# Patient Record
(demographics unavailable — no encounter records)

---

## 2024-12-10 NOTE — PLAN
[FreeTextEntry1] : Labs drawn in office #Obesity, Prediabetes: On Zepbound 12.5 mg weekly from  [facility], no overt side effects noted. Ricco f/u labwork. #ED: on Cialis, requests renewal, Rx generic Tadalafil 10 mg daily as needed.  #Thyroid nodules: subcm note on July US, repeat marlee 7/2025 ordered. #Gout: no recurrence since last visit, cut out red meat; on low purine diet.   Vaccines: Flu vaccine administered. discussed COVID19 booster--to get locally.  Shingrix discussed--he will consider. Colonoscopy overdue-- instructed to contact his GI physician

## 2024-12-10 NOTE — HEALTH RISK ASSESSMENT
[Very Good] : ~his/her~  mood as very good [Yes] : Yes [1 or 2 (0 pts)] : 1 or 2 (0 points) [Never (0 pts)] : Never (0 points) [No] : In the past 12 months have you used drugs other than those required for medical reasons? No [No falls in past year] : Patient reported no falls in the past year [0] : 2) Feeling down, depressed, or hopeless: Not at all (0) [PHQ-2 Negative - No further assessment needed] : PHQ-2 Negative - No further assessment needed [Former] : Former [10-14] : 10-14 [HIV test declined] : HIV test declined [Hepatitis C test declined] : Hepatitis C test declined [None] : None [Alone] : lives alone [Employed] : employed [] :  [# Of Children ___] : has [unfilled] children [Sexually Active] : sexually active [Feels Safe at Home] : Feels safe at home [Fully functional (bathing, dressing, toileting, transferring, walking, feeding)] : Fully functional (bathing, dressing, toileting, transferring, walking, feeding) [Fully functional (using the telephone, shopping, preparing meals, housekeeping, doing laundry, using] : Fully functional and needs no help or supervision to perform IADLs (using the telephone, shopping, preparing meals, housekeeping, doing laundry, using transportation, managing medications and managing finances) [Reports normal functional visual acuity (ie: able to read med bottle)] : Reports normal functional visual acuity [Smoke Detector] : smoke detector [Carbon Monoxide Detector] : carbon monoxide detector [Safety elements used in home] : safety elements used in home [Seat Belt] :  uses seat belt [Sunscreen] : uses sunscreen [Monthly or less (1 pt)] : Monthly or less (1 point) [> 15 Years] : > 15 Years [NO] : No [de-identified] : Urology, Dermatology, ENT, GI [Audit-CScore] : 1 [de-identified] : as per HPI [de-identified] : as per HP [ONK3Lptut] : 0 [de-identified] : 1 ppd x 10 yrs [Change in mental status noted] : No change in mental status noted [Language] : denies difficulty with language [Behavior] : denies difficulty with behavior [Learning/Retaining New Information] : denies difficulty learning/retaining new information [Handling Complex Tasks] : denies difficulty handling complex tasks [Reasoning] : denies difficulty with reasoning [Spatial Ability and Orientation] : denies difficulty with spatial ability and orientation [Reports changes in hearing] : Reports no changes in hearing [Reports changes in vision] : Reports no changes in vision [Reports changes in dental health] : Reports no changes in dental health [TB Exposure] : is not being exposed to tuberculosis [ColonoscopyComments] : last >10 yrs ago--to schedule appt with GI Dr. Mojica [FreeTextEntry2] :  [de-identified] : +glasses

## 2024-12-10 NOTE — PHYSICAL EXAM
[Normal Sphincter Tone] : normal sphincter tone [No Mass] : no mass [Penis Abnormality] : normal circumcised penis [Testes Tenderness] : no tenderness of the testes [Prostate Enlargement] : the prostate was not enlarged [Normal] : affect was normal and insight and judgment were intact

## 2024-12-10 NOTE — HISTORY OF PRESENT ILLNESS
[FreeTextEntry1] : annual physical [de-identified] : MARTÍNEZ HUITRON is a 63 year old male who presents for annual comprehensive exam. PMHX: Obesity, Prediabetes, ED, Gout, Thyroid Nodules. -No recurrence of gout since last visit. Has completely cut out red meat.  -On Zepbound presently at 12.5 mg weekly, has lost 35 lbs in 8 months. Tolerating well with no side effects, no nausea or changes in bowel habits. Content with the weight loss and motivated to continue. Walks 7-10K steps daily, tries to do additional 1-2 workouts/week.  -ED, was following with Dr. Love (urologist), inquires on refilling Tadalafil with me as med as been working well.

## 2025-02-10 NOTE — ASSESSMENT
[FreeTextEntry1] : Patient with history of chronic IBS.  He has been on Zepbound for 9 months and has lost 40 pounds.  He is being referred for screening colonoscopy which will be scheduled.  FOBT will be sent to the lab.  He does have a history of abnormal LFTs in the past.  An abdominal sonogram will be ordered.

## 2025-02-10 NOTE — HISTORY OF PRESENT ILLNESS
[FreeTextEntry1] : Patient is a 63-year-old gentleman who is referred for a screening colonoscopy.  His last colonoscopy was over 10 years ago.  Patient has been on Zepbound for 9 months and has lost 40 pounds.  He does have a history of irritable bowel syndrome which has been stable.  He denies seeing any blood or mucus in the stool. Patient has no upper gastrointestinal symptoms.

## 2025-02-10 NOTE — PHYSICAL EXAM

## 2025-02-24 NOTE — HISTORY OF PRESENT ILLNESS
[FreeTextEntry1] : Less than 8 months status post right trigger thumb cortisone injection #1.  He returns today,

## 2025-02-24 NOTE — PHYSICAL EXAM
[de-identified] : - Constitutional: This is a male in no obvious distress.  - Psych: Patient is alert and oriented to person, place and time.  Patient has a normal mood and affect. - Cardiovascular: Normal pulses throughout the upper extremities.  No significant varicosities are noted in the upper extremities. - Neuro: Strength and sensation are intact throughout the upper extremities.  Patient has normal coordination.  ---  Examination of his right thumb demonstrates swelling and tenderness along the A1 pulley.  There is no obvious triggering, as he cannot flex to the point of triggering.  He states that it does trigger at times.  He also has some swelling and tenderness along the IP joint and diffuse swelling of the thumb.  There is no swelling or tenderness along the CMC joint.  He is neurovascularly intact distally. [de-identified] : Radiographs both hands dated 5/1/2024 demonstrated minimal degenerative changes at the CMC joints bilaterally.

## 2025-02-26 NOTE — PHYSICAL EXAM
[de-identified] : - Constitutional: This is a male in no obvious distress.  - Psych: Patient is alert and oriented to person, place and time.  Patient has a normal mood and affect. - Cardiovascular: Normal pulses throughout the upper extremities.  No significant varicosities are noted in the upper extremities. - Neuro: Strength and sensation are intact throughout the upper extremities.  Patient has normal coordination.  ---  Examination of his right thumb demonstrates swelling and tenderness along the A1 pulley.  There is no obvious triggering.  There is no swelling or tenderness along the CMC joint.  He is neurovascularly intact distally. [de-identified] : Radiographs both hands dated 5/1/2024 demonstrated minimal degenerative changes at the CMC joints bilaterally.

## 2025-02-26 NOTE — HISTORY OF PRESENT ILLNESS
[FreeTextEntry1] : Less than 8 months status post right trigger thumb cortisone injection #1.  He returns today, for right thumb triggering. He states that the last cortisone injection did provide his symptoms with relief, and his symptoms recently recurred.

## 2025-02-26 NOTE — DISCUSSION/SUMMARY
[FreeTextEntry1] : I had a discussion regarding today's visit, the diagnosis and treatment recommendations and options.  We also discussed changes since the last visit.  At this time, I recommended a repeat cortisone injection, which he agreed to proceed with at the right trigger thumb.   The patient has agreed to the above plan of management and has expressed full understanding.  All questions were fully answered to the patient's satisfaction.  My cumulative time spent on today's visit included: Preparation for the visit, review of the medical records, review of pertinent diagnostic studies, examination and counseling of the patient on the above diagnosis, treatment plan and prognosis, orders of diagnostic tests, medications and/or appropriate procedures and documentation in the medical records of today's visit.

## 2025-02-26 NOTE — PROCEDURE
[FreeTextEntry1] : -  After a discussion of risks and benefits, the patient agreed to proceed with a cortisone injection.  -  Side: Right -  Finger: trigger thumb -  Medications: 0.5 cc of 1% Lidocaine and 1 cc of Celestone Soluspan, 6mg/cc, using sterile technique. -  Patient tolerated the procedure well, without complications. -  Patient was told that the symptoms may worsen for a day or two, and should then begin to improve. -  Instructions: Patient was instructed on activity modification for the next several days. -  Follow-up: According to symptoms.